# Patient Record
Sex: MALE | Race: WHITE | Employment: UNEMPLOYED | URBAN - METROPOLITAN AREA
[De-identification: names, ages, dates, MRNs, and addresses within clinical notes are randomized per-mention and may not be internally consistent; named-entity substitution may affect disease eponyms.]

---

## 2020-11-10 ENCOUNTER — HOSPITAL ENCOUNTER (OUTPATIENT)
Dept: LAB | Age: 65
Discharge: HOME | End: 2020-11-10
Payer: COMMERCIAL

## 2020-11-10 LAB
ANION GAP SERPL CALC-SCNC: 2 MMOL/L (ref 7–16)
APTT PPP: 29.4 SEC (ref 24.1–35.1)
BUN SERPL-MCNC: 12 MG/DL (ref 8–23)
CALCIUM SERPL-MCNC: 8.8 MG/DL (ref 8.3–10.4)
CHLORIDE SERPL-SCNC: 107 MMOL/L (ref 98–107)
CO2 SERPL-SCNC: 29 MMOL/L (ref 21–32)
CREAT SERPL-MCNC: 0.58 MG/DL (ref 0.8–1.5)
ERYTHROCYTE [DISTWIDTH] IN BLOOD BY AUTOMATED COUNT: 12.1 % (ref 11.9–14.6)
GLUCOSE SERPL-MCNC: 102 MG/DL (ref 65–100)
HCT VFR BLD AUTO: 43.3 % (ref 41.1–50.3)
HGB BLD-MCNC: 14.7 G/DL (ref 13.6–17.2)
INR PPP: 1
MCH RBC QN AUTO: 30.6 PG (ref 26.1–32.9)
MCHC RBC AUTO-ENTMCNC: 33.9 G/DL (ref 31.4–35)
MCV RBC AUTO: 90.2 FL (ref 79.6–97.8)
NRBC # BLD: 0 K/UL (ref 0–0.2)
PLATELET # BLD AUTO: 199 K/UL (ref 150–450)
PMV BLD AUTO: 9.1 FL (ref 9.4–12.3)
POTASSIUM SERPL-SCNC: 4.1 MMOL/L (ref 3.5–5.1)
PROTHROMBIN TIME: 13.1 SEC (ref 12.5–14.7)
RBC # BLD AUTO: 4.8 M/UL (ref 4.23–5.6)
SODIUM SERPL-SCNC: 138 MMOL/L (ref 138–145)
WBC # BLD AUTO: 4.8 K/UL (ref 4.3–11.1)

## 2020-11-10 PROCEDURE — 36415 COLL VENOUS BLD VENIPUNCTURE: CPT

## 2020-11-10 PROCEDURE — 80048 BASIC METABOLIC PNL TOTAL CA: CPT

## 2020-11-10 PROCEDURE — 85610 PROTHROMBIN TIME: CPT

## 2020-11-10 PROCEDURE — 85027 COMPLETE CBC AUTOMATED: CPT

## 2020-11-10 PROCEDURE — 85730 THROMBOPLASTIN TIME PARTIAL: CPT

## 2020-11-13 ENCOUNTER — HOSPITAL ENCOUNTER (OUTPATIENT)
Dept: OTHER | Age: 65
Discharge: HOME | End: 2020-11-13
Attending: RADIOLOGY
Payer: COMMERCIAL

## 2020-11-13 VITALS
HEART RATE: 51 BPM | RESPIRATION RATE: 16 BRPM | HEIGHT: 67 IN | TEMPERATURE: 98.1 F | WEIGHT: 160 LBS | SYSTOLIC BLOOD PRESSURE: 127 MMHG | BODY MASS INDEX: 25.11 KG/M2 | OXYGEN SATURATION: 98 % | DIASTOLIC BLOOD PRESSURE: 74 MMHG

## 2020-11-13 DIAGNOSIS — N40.0 BPH (BENIGN PROSTATIC HYPERPLASIA): ICD-10-CM

## 2020-11-13 PROCEDURE — 74011250637 HC RX REV CODE- 250/637: Performed by: RADIOLOGY

## 2020-11-13 PROCEDURE — 74011000250 HC RX REV CODE- 250: Performed by: RADIOLOGY

## 2020-11-13 PROCEDURE — C1769 GUIDE WIRE: HCPCS

## 2020-11-13 PROCEDURE — 77030021532 HC CATH ANGI DX IMPRS MRTM -B

## 2020-11-13 PROCEDURE — 77030016064 HC PARTIC EMBSPHR BSPH -D

## 2020-11-13 PROCEDURE — 74011250636 HC RX REV CODE- 250/636: Performed by: RADIOLOGY

## 2020-11-13 PROCEDURE — 37242 VASC EMBOLIZE/OCCLUDE ARTERY: CPT

## 2020-11-13 PROCEDURE — 74011000636 HC RX REV CODE- 636: Performed by: RADIOLOGY

## 2020-11-13 PROCEDURE — C1894 INTRO/SHEATH, NON-LASER: HCPCS

## 2020-11-13 PROCEDURE — 77030019569 HC BND COMPR RAD TERU -B

## 2020-11-13 PROCEDURE — C1887 CATHETER, GUIDING: HCPCS

## 2020-11-13 RX ORDER — NAPROXEN 250 MG/1
500 TABLET ORAL
Status: COMPLETED | OUTPATIENT
Start: 2020-11-13 | End: 2020-11-13

## 2020-11-13 RX ORDER — VERAPAMIL HYDROCHLORIDE 2.5 MG/ML
2 INJECTION, SOLUTION INTRAVENOUS ONCE
Status: COMPLETED | OUTPATIENT
Start: 2020-11-13 | End: 2020-11-13

## 2020-11-13 RX ORDER — FENTANYL CITRATE 50 UG/ML
25-50 INJECTION, SOLUTION INTRAMUSCULAR; INTRAVENOUS
Status: DISCONTINUED | OUTPATIENT
Start: 2020-11-13 | End: 2020-11-17 | Stop reason: HOSPADM

## 2020-11-13 RX ORDER — SODIUM CHLORIDE 9 MG/ML
25 INJECTION, SOLUTION INTRAVENOUS CONTINUOUS
Status: DISCONTINUED | OUTPATIENT
Start: 2020-11-13 | End: 2020-11-17 | Stop reason: HOSPADM

## 2020-11-13 RX ORDER — NITROGLYCERIN 20 MG/100ML
200 INJECTION INTRAVENOUS
Status: DISCONTINUED | OUTPATIENT
Start: 2020-11-13 | End: 2020-11-13

## 2020-11-13 RX ORDER — MIDAZOLAM HYDROCHLORIDE 1 MG/ML
.5-2 INJECTION, SOLUTION INTRAMUSCULAR; INTRAVENOUS
Status: DISCONTINUED | OUTPATIENT
Start: 2020-11-13 | End: 2020-11-17 | Stop reason: HOSPADM

## 2020-11-13 RX ORDER — LIDOCAINE HYDROCHLORIDE 20 MG/ML
2-20 INJECTION, SOLUTION INFILTRATION; PERINEURAL ONCE
Status: COMPLETED | OUTPATIENT
Start: 2020-11-13 | End: 2020-11-13

## 2020-11-13 RX ORDER — HEPARIN SODIUM 200 [USP'U]/100ML
1000 INJECTION, SOLUTION INTRAVENOUS AS NEEDED
Status: DISCONTINUED | OUTPATIENT
Start: 2020-11-13 | End: 2020-11-17 | Stop reason: HOSPADM

## 2020-11-13 RX ORDER — OXYCODONE AND ACETAMINOPHEN 5; 325 MG/1; MG/1
1 TABLET ORAL
Status: DISCONTINUED | OUTPATIENT
Start: 2020-11-13 | End: 2020-11-17 | Stop reason: HOSPADM

## 2020-11-13 RX ORDER — HEPARIN SODIUM 1000 [USP'U]/ML
3000 INJECTION, SOLUTION INTRAVENOUS; SUBCUTANEOUS ONCE
Status: COMPLETED | OUTPATIENT
Start: 2020-11-13 | End: 2020-11-13

## 2020-11-13 RX ORDER — ONDANSETRON 2 MG/ML
4 INJECTION INTRAMUSCULAR; INTRAVENOUS
Status: DISCONTINUED | OUTPATIENT
Start: 2020-11-13 | End: 2020-11-17 | Stop reason: HOSPADM

## 2020-11-13 RX ADMIN — MIDAZOLAM 1 MG: 1 INJECTION INTRAMUSCULAR; INTRAVENOUS at 10:06

## 2020-11-13 RX ADMIN — MIDAZOLAM 0.5 MG: 1 INJECTION INTRAMUSCULAR; INTRAVENOUS at 11:25

## 2020-11-13 RX ADMIN — FENTANYL CITRATE 50 MCG: 50 INJECTION, SOLUTION INTRAMUSCULAR; INTRAVENOUS at 10:06

## 2020-11-13 RX ADMIN — FENTANYL CITRATE 25 MCG: 50 INJECTION, SOLUTION INTRAMUSCULAR; INTRAVENOUS at 11:25

## 2020-11-13 RX ADMIN — MIDAZOLAM 1 MG: 1 INJECTION INTRAMUSCULAR; INTRAVENOUS at 10:14

## 2020-11-13 RX ADMIN — MIDAZOLAM 0.5 MG: 1 INJECTION INTRAMUSCULAR; INTRAVENOUS at 10:34

## 2020-11-13 RX ADMIN — FENTANYL CITRATE 25 MCG: 50 INJECTION, SOLUTION INTRAMUSCULAR; INTRAVENOUS at 11:02

## 2020-11-13 RX ADMIN — LIDOCAINE HYDROCHLORIDE 100 MG: 20 INJECTION, SOLUTION INFILTRATION; PERINEURAL at 10:16

## 2020-11-13 RX ADMIN — NAPROXEN 500 MG: 250 TABLET ORAL at 08:53

## 2020-11-13 RX ADMIN — VERAPAMIL HYDROCHLORIDE 2 MG: 2.5 INJECTION, SOLUTION INTRAVENOUS at 10:22

## 2020-11-13 RX ADMIN — FENTANYL CITRATE 25 MCG: 50 INJECTION, SOLUTION INTRAMUSCULAR; INTRAVENOUS at 11:53

## 2020-11-13 RX ADMIN — FENTANYL CITRATE 50 MCG: 50 INJECTION, SOLUTION INTRAMUSCULAR; INTRAVENOUS at 10:14

## 2020-11-13 RX ADMIN — NITROGLYCERIN 0.2 MG: 20 INJECTION INTRAVENOUS at 10:22

## 2020-11-13 RX ADMIN — MIDAZOLAM 0.5 MG: 1 INJECTION INTRAMUSCULAR; INTRAVENOUS at 11:53

## 2020-11-13 RX ADMIN — SODIUM CHLORIDE 25 ML/HR: 900 INJECTION, SOLUTION INTRAVENOUS at 10:00

## 2020-11-13 RX ADMIN — HEPARIN SODIUM 3000 UNITS: 1000 INJECTION INTRAVENOUS; SUBCUTANEOUS at 10:22

## 2020-11-13 RX ADMIN — MIDAZOLAM 0.5 MG: 1 INJECTION INTRAMUSCULAR; INTRAVENOUS at 11:02

## 2020-11-13 RX ADMIN — IOPAMIDOL 155 ML: 612 INJECTION, SOLUTION INTRAVENOUS at 12:30

## 2020-11-13 RX ADMIN — HEPARIN SODIUM 2000 UNITS: 200 INJECTION, SOLUTION INTRAVENOUS at 10:16

## 2020-11-13 RX ADMIN — FENTANYL CITRATE 25 MCG: 50 INJECTION, SOLUTION INTRAMUSCULAR; INTRAVENOUS at 10:34

## 2020-11-13 NOTE — DISCHARGE INSTRUCTIONS
[address]  Department of Interventional Radiology  [facility]  [phone] Office  [phone] Fax    Prostate Artery Embolization Discharge Instructions    General Information:         Prostate artery embolization is a minimally invasive procedure that is increasingly being used for treatment of a wide range of conditions. A variety of embolic materials are available, such as metal coils, sclerosants (glue, [place], absolute ethanol, lipiodol), and particulate embolic agents (polyvinyl alcohol particles and embospheres). Selection of the appropriate agent depends on the clinical application, technical and clinical endpoints, as well as the pathology lesion(s) targeted. Home Care Instructions: You can resume your regular diet. Do not tub bathe, swim, drink alcohol, or make any important legal decisions for the next 48 hours. Do not lift anything heavier than a gallon of milk for 5 days. If you take Glucophage (metformin) for diabetes, do not take it for the next 48 hours. Over the next week you may experience abdominal pain, nausea, vomiting, and a low grade fever. You will be given a prescription to deal with these symptoms. Follow-Up Instructions:  Please see your ordering doctor as he/she has requested. Call If:   You should call your Physician and/or the Radiology Nurse if you have any signs of infection like fever, drainage, redness, and/or swelling. If the puncture site should ooze, please call. Also call if you have any pain, decreased sensation, numbness, tingling, swelling, or change in color to the affected extremity. SEEK IMMEDIATE MEDICAL CARE IF YOUR PUNCTURE SITE STARTS TO BLEED. APPLY ENOUGH FIRM PRESSURE TO THE SITE WITH THE TIPS OF YOUR FINGERS TO STOP THE BLEEDING. Arterial bleeding is a medical emergency and should be evaluated immediately.   Call your doctor immediately if you develop a fever greater than 101.5°F, shaking, chills, or dizziness. Also notify your doctor if you develop severe right upper abdominal pain or nausea/vomiting and the symptoms are not relieved by medication. To Reach Us: If you have any questions about your procedure, please call the Interventional Radiology department at [phone]. After business hours (5pm) and weekends, call the answering service at [phone] and ask for the Radiologist on call to be paged. Si tiene Preguntas acerca del procedimiento, por favor llame al departamento de Radiología Intervencional al [phone]. Después de horas de oficina (5 pm) y los fines de [place], llamar al Leatha Moya al [phone] y pregunte por el Radiologo de Jen Celis. Interventional Radiology General Nurse Discharge    After general anesthesia or intravenous sedation, for 24 hours or while taking prescription Narcotics:  · Limit your activities  · Do not drive and operate hazardous machinery  · Do not make important personal or business decisions  · Do  not drink alcoholic beverages  · If you have not urinated within 8 hours after discharge, please contact your surgeon on call. * Please give a list of your current medications to your Primary Care Provider. * Please update this list whenever your medications are discontinued, doses are     changed, or new medications (including over-the-counter products) are added. * Please carry medication information at all times in case of emergency situations. These are general instructions for a healthy lifestyle:    No smoking/ No tobacco products/ Avoid exposure to second hand smoke  Surgeon General's Warning:  Quitting smoking now greatly reduces serious risk to your health.     Obesity, smoking, and sedentary lifestyle greatly increases your risk for illness  A healthy diet, regular physical exercise & weight monitoring are important for maintaining a healthy lifestyle    You may be retaining fluid if you have a history of heart failure or if you experience any of the following symptoms:  Weight gain of 3 pounds or more overnight or 5 pounds in a week, increased swelling in our hands or feet or shortness of breath while lying flat in bed. Please call your doctor as soon as you notice any of these symptoms; do not wait until your next office visit. Recognize signs and symptoms of STROKE:  F-face looks uneven    A-arms unable to move or move unevenly    S-speech slurred or non-existent    T-time-call 911 as soon as signs and symptoms begin-DO NOT go       Back to bed or wait to see if you get better-TIME IS BRAIN.

## 2020-11-13 NOTE — PROCEDURES
Department of Interventional Radiology  [phone]        Interventional Radiology Brief Procedure Note    Patient: Kate Salazar MRN: [id number]  SSN: [id number]    YOB: 1955  Age: 63 y.o. Sex: male      Date of Procedure: 11/13/2020    Pre-Procedure Diagnosis: BPH with LUTS    Post-Procedure Diagnosis: SAME    Procedure(s): Prostate artery embolization    Brief Description of Procedure: Bilateral prostate artery embolization with 300-500 micron embospheres. Left radial artery access. Performed By: August Burk MD     Assistants: None    Anesthesia:Moderate Sedation    Estimated Blood Loss: Less than 10ml    Specimens:  None    Implants:  None    Findings: Angiography reveals prostate enlargement. Cone beam CT confirms right prostate artery cannulation. Left prostate artery angiography with excellent filling of the left monse-prostate on angiography. Embolization performed to near stasis bilaterally.      Complications: None      Signed By: August Burk MD     November 13, 2020

## 2020-11-13 NOTE — PROGRESS NOTES
3 ml of air removed from TR Band (with MD permission). (total of 13 ml removed). No bleeding at site. Recovery period without difficulty. Pt alert and oriented and denies pain. Dressing is clean, dry, and intact. Reviewed discharge instructions with patient, verbalized understanding. Pt escorted to lobby discharge area via wheelchair. Vital signs and Carl score completed.

## 2020-11-13 NOTE — PROGRESS NOTES
TRANSFER - OUT REPORT:    Verbal report given to Dagowill Wong on Genet Alonzo  being transferred to IR prep room 6 for routine post - op       Report consisted of patients Situation, Background, Assessment and   Recommendations(SBAR). Information from the following report(s) SBAR, Kardex, Procedure Summary and Intake/Output was reviewed with the receiving nurse. Lines:   Peripheral IV 11/13/20 Right Antecubital (Active)        Opportunity for questions and clarification was provided. Left radial wrist compression device secured with 13 cc to balloon. Remove 2 cc every 15 minutes starting at 13:30. Re-inflate balloon if bleeding is noted. Hold on to plunger on syringe firmly when using.

## 2020-11-13 NOTE — H&P
History and Physical          Department of Interventional Radiology  [phone]    History and Physical    Patient:  Shoshana Jeffers MRN:  [id number]  SSN:  [id number]    YOB: 1955  Age:  63 y.o. Sex:  male      Primary Care Provider:  Susan Cunningham MD  Referring Physician:  Mando Bauer MD    Subjective:     Chief Complaint: Chronic urinary retention    History of the Present Illness: The patient is a 63 y.o. male with chronic urinary retention related to BPH requiring intermittent self-catheterization. Continues to have significant symptoms (IPSS 20). He is NPO. No acute complaints. .       History reviewed. No pertinent past medical history. History reviewed. No pertinent surgical history. Review of Systems:    Pertinent items are noted in the History of Present Illness. Prior to Admission medications    Not on File        No Known Allergies    History reviewed. No pertinent family history. Social History     Tobacco Use    Smoking status: Never Smoker   Substance Use Topics    Alcohol use: Not Currently        Objective:       Physical Examination:    Vitals:    11/13/20 0721   BP: 119/70   Pulse: 64   Resp: 16   Temp: 98.1 °F (36.7 °C)   SpO2: 95%   Weight: 72.6 kg (160 lb)   Height: 5' 7\" (1.702 m)       Pain Assessment  Pain Intensity 1: 0 (11/13/20 0721)        Patient Stated Pain Goal: 0      General: WDWN in NAD. AAOx4. Respirations non-labored.     Laboratory:     Lab Results   Component Value Date/Time    Sodium 138 11/10/2020 10:09 AM    Potassium 4.1 11/10/2020 10:09 AM    Chloride 107 11/10/2020 10:09 AM    CO2 29 11/10/2020 10:09 AM    Anion gap 2 (L) 11/10/2020 10:09 AM    Glucose 102 (H) 11/10/2020 10:09 AM    BUN 12 11/10/2020 10:09 AM    Creatinine 0.58 (L) 11/10/2020 10:09 AM    GFR est AA >60 11/10/2020 10:09 AM    GFR est non-AA >60 11/10/2020 10:09 AM    Calcium 8.8 11/10/2020 10:09 AM     Lab Results   Component Value Date/Time    WBC 4.8 11/10/2020 10:09 AM    HGB 14.7 11/10/2020 10:09 AM    HCT 43.3 11/10/2020 10:09 AM    PLATELET 297 93/80/5888 10:09 AM     Lab Results   Component Value Date/Time    aPTT 29.4 11/10/2020 10:09 AM    Prothrombin time 13.1 11/10/2020 10:09 AM    INR 1.0 11/10/2020 10:09 AM       Assessment:     62 yo male with BPH and LUTS with IPSS in severe category (20) who presents for PAE. Plan:     Planned Procedure:  PAE. Risks, benefits, and alternatives reviewed with patient and he agrees to proceed with the procedure.       Signed By: Dodie Nunes MD     November 13, 2020

## 2024-07-18 ENCOUNTER — APPOINTMENT (RX ONLY)
Dept: URBAN - METROPOLITAN AREA CLINIC 330 | Facility: CLINIC | Age: 69
Setting detail: DERMATOLOGY
End: 2024-07-18

## 2024-07-18 DIAGNOSIS — Z71.89 OTHER SPECIFIED COUNSELING: ICD-10-CM

## 2024-07-18 DIAGNOSIS — D22 MELANOCYTIC NEVI: ICD-10-CM

## 2024-07-18 DIAGNOSIS — L82.1 OTHER SEBORRHEIC KERATOSIS: ICD-10-CM

## 2024-07-18 DIAGNOSIS — L57.8 OTHER SKIN CHANGES DUE TO CHRONIC EXPOSURE TO NONIONIZING RADIATION: ICD-10-CM

## 2024-07-18 PROBLEM — D22.5 MELANOCYTIC NEVI OF TRUNK: Status: ACTIVE | Noted: 2024-07-18

## 2024-07-18 PROCEDURE — 99203 OFFICE O/P NEW LOW 30 MIN: CPT

## 2024-07-18 PROCEDURE — ? TREATMENT REGIMEN

## 2024-07-18 PROCEDURE — ? BODY PHOTOGRAPHY

## 2024-07-18 PROCEDURE — ? FULL BODY SKIN EXAM

## 2024-07-18 PROCEDURE — ? COUNSELING

## 2024-07-18 PROCEDURE — ? EDUCATIONAL RESOURCES PROVIDED

## 2024-07-18 ASSESSMENT — LOCATION DETAILED DESCRIPTION DERM
LOCATION DETAILED: LEFT ANTERIOR LATERAL PROXIMAL THIGH
LOCATION DETAILED: RIGHT BUTTOCK
LOCATION DETAILED: RIGHT ANTERIOR LATERAL PROXIMAL UPPER ARM
LOCATION DETAILED: INFERIOR THORACIC SPINE
LOCATION DETAILED: RIGHT LATERAL ABDOMEN
LOCATION DETAILED: LEFT MEDIAL UPPER BACK
LOCATION DETAILED: SUPERIOR THORACIC SPINE

## 2024-07-18 ASSESSMENT — LOCATION SIMPLE DESCRIPTION DERM
LOCATION SIMPLE: ABDOMEN
LOCATION SIMPLE: LEFT THIGH
LOCATION SIMPLE: LEFT UPPER BACK
LOCATION SIMPLE: UPPER BACK
LOCATION SIMPLE: RIGHT BUTTOCK
LOCATION SIMPLE: RIGHT UPPER ARM

## 2024-07-18 ASSESSMENT — LOCATION ZONE DERM
LOCATION ZONE: LEG
LOCATION ZONE: ARM
LOCATION ZONE: TRUNK

## 2024-07-18 NOTE — PROCEDURE: BODY PHOTOGRAPHY
Consent was obtained for whole body photography. Patient understands that photograph costs may not be covered by insurance.
Was The Entire Body Photographed (Cannot Bill Unless Entire Body Photographed)?: Please Select Yes or No - If you select Yes you are confirming that you took full body photographs
Reason For Photography: The patient is obtaining whole body photography to observe existing suspicious moles and or monitor for the appearance of any new lesions.
Detail Level: Detailed
Number Of Photographs (Optional- Will Not Render If 0): 1
Whole Body Statement: The whole body was photographed today.

## 2025-08-04 ENCOUNTER — OFFICE VISIT (OUTPATIENT)
Dept: ORTHOPEDIC SURGERY | Age: 70
End: 2025-08-04

## 2025-08-04 VITALS — WEIGHT: 159.8 LBS | HEIGHT: 68 IN | BODY MASS INDEX: 24.22 KG/M2

## 2025-08-04 DIAGNOSIS — M25.561 ACUTE PAIN OF RIGHT KNEE: Primary | ICD-10-CM

## 2025-08-04 DIAGNOSIS — M70.51 PATELLAR BURSITIS, RIGHT: ICD-10-CM

## 2025-08-04 RX ORDER — VIT C/HESPERIDIN/BIOFLAVONOIDS 500-100 MG
TABLET ORAL
COMMUNITY

## 2025-08-04 RX ORDER — FLUTICASONE PROPIONATE 50 MCG
2 SPRAY, SUSPENSION (ML) NASAL DAILY
COMMUNITY
Start: 2024-08-12